# Patient Record
Sex: FEMALE | Race: WHITE
[De-identification: names, ages, dates, MRNs, and addresses within clinical notes are randomized per-mention and may not be internally consistent; named-entity substitution may affect disease eponyms.]

---

## 2023-08-01 ENCOUNTER — HOSPITAL ENCOUNTER (OUTPATIENT)
Dept: HOSPITAL 101 - US | Age: 43
Discharge: HOME | End: 2023-08-01
Payer: COMMERCIAL

## 2023-08-01 VITALS — BODY MASS INDEX: 37.9 KG/M2

## 2023-08-01 VITALS — SYSTOLIC BLOOD PRESSURE: 109 MMHG | HEART RATE: 77 BPM | OXYGEN SATURATION: 96 % | DIASTOLIC BLOOD PRESSURE: 57 MMHG

## 2023-08-01 DIAGNOSIS — E04.1: Primary | ICD-10-CM

## 2023-08-01 PROCEDURE — 88173 CYTOPATH EVAL FNA REPORT: CPT

## 2023-08-01 PROCEDURE — 10005 FNA BX W/US GDN 1ST LES: CPT

## 2023-08-01 RX ADMIN — LIDOCAINE HYDROCHLORIDE 0 ML: 10 INJECTION, SOLUTION INFILTRATION; PERINEURAL at 12:56

## 2023-08-01 NOTE — US_ITS
The [address] [address] 
     [phone] 
  
  
Patient Name: 
ADDY ROSS 
  
MRN: [id number]    YOB: 1980    Sex: F 
Assigned Patient Location: [place] 
Current Patient Location:   
Accession/Order Number: [id number] 
Exam Date: 8/01/2023  11:30    Report Date: 8/01/2023  13:03 
  
At the request of: 
WOOD AG   
  
Procedure:  US biopsy thyroid 
  
EXAMINATION: US biopsy thyroid  
  
HISTORY: Left thyroid nodule 
  
COMPARISON: No relevant comparison available.  
  
TECHNIQUE: After obtaining informed consent, an ultrasound-guided biopsy was  
performed in the usual sterile manner.  
  
FINDINGS: 
IMAGING: Ultrasound  
BIOPSY NEEDLE: 25-gauge 2 inch  
SPECIMEN TYPE, #, LOCATION: 4 samples, 1.8 cm inferior left thyroid nodule 
MEDICATION: 3 cc 1% buffered lidocaine  
COMPLICATIONS: None.  
LABORATORY: As ordered by clinician. Molecular studies sent  
OTHER: Negative.  
  
ORDER #: [id number] US/US biopsy thyroid  
IMPRESSION:   
   
Uneventful ultrasound guided biopsy. The patient was instructed to obtain   
follow up care and biopsy results from the referring physician.   
   
   
Electronically authenticated by: ZAN PUGA   Date: 8/01/2023  13:03